# Patient Record
Sex: MALE | Race: ASIAN | NOT HISPANIC OR LATINO
[De-identification: names, ages, dates, MRNs, and addresses within clinical notes are randomized per-mention and may not be internally consistent; named-entity substitution may affect disease eponyms.]

---

## 2018-12-11 ENCOUNTER — APPOINTMENT (OUTPATIENT)
Dept: ORTHOPEDIC SURGERY | Facility: CLINIC | Age: 48
End: 2018-12-11
Payer: COMMERCIAL

## 2018-12-11 VITALS — HEIGHT: 71 IN | BODY MASS INDEX: 24.08 KG/M2 | WEIGHT: 172 LBS

## 2018-12-11 PROCEDURE — 99203 OFFICE O/P NEW LOW 30 MIN: CPT

## 2018-12-11 PROCEDURE — 73552 X-RAY EXAM OF FEMUR 2/>: CPT | Mod: LT

## 2018-12-17 ENCOUNTER — APPOINTMENT (OUTPATIENT)
Dept: ORTHOPEDIC SURGERY | Facility: CLINIC | Age: 48
End: 2018-12-17

## 2019-03-25 ENCOUNTER — APPOINTMENT (OUTPATIENT)
Dept: ORTHOPEDIC SURGERY | Facility: CLINIC | Age: 49
End: 2019-03-25
Payer: COMMERCIAL

## 2019-03-25 VITALS
WEIGHT: 175 LBS | HEART RATE: 75 BPM | HEIGHT: 71 IN | BODY MASS INDEX: 24.5 KG/M2 | DIASTOLIC BLOOD PRESSURE: 89 MMHG | SYSTOLIC BLOOD PRESSURE: 132 MMHG

## 2019-03-25 DIAGNOSIS — M54.16 RADICULOPATHY, LUMBAR REGION: ICD-10-CM

## 2019-03-25 PROCEDURE — 99214 OFFICE O/P EST MOD 30 MIN: CPT

## 2019-03-25 NOTE — DISCUSSION/SUMMARY
[de-identified] : 47 yo male with lumbar radiculopathy with lumbar DDD, L4-S1, cannot rule out spondylolisthesis , patient did not want xray, recommend PT, NSAIDS. Next visit xrays.

## 2019-03-25 NOTE — HISTORY OF PRESENT ILLNESS
[Prolonged Sitting] : worsened by prolonged sitting [Prolonged Standing] : worsened by prolonged standing [Rest] : relieved by rest [de-identified] : Pt presents with c/o LLEk pain on anterior aspect to knee, and occasionally to medial aspect of LLE from knee to ankle. Pt denies numbness,tingling to B/ LE. Pt is not taking meds for pain at this time. Pt uses knee sleeve, this provides some relief in pain. Pt had PT (sessions) last session on 12/2018, this provided some relief in pain. Denies having CONSUELO in  the past.  Pt saw Dr Marte for LLE pain and was diagnosed with benign-appearing lucency involving the proximal femur .  [4] : an average pain level of 4/10 [2] : a minimum pain level of 2/10 [5] : a maximum pain level of 5/10 [Constant] : ~He/She~ states the symptoms seem to be constant [Ataxia] : no ataxia [Incontinence] : no incontinence [Loss of Dexterity] : good dexterity [Urinary Ret.] : no urinary retention

## 2019-03-25 NOTE — PHYSICAL EXAM
[Normal] : Oriented to person, place, and time, insight and judgement were intact and the affect was normal [UE/LE] : Sensory: Intact in bilateral upper & lower extremities [ALL] : Biceps, brachioradialis, triceps, patellar, ankle and plantar 2+ and symmetric bilaterally [Woody's Sign] : negative Woody's sign [Pronator Drift] : negative pronator drift [SLR] : negative straight leg raise [de-identified] : NTTP L spine and b/l paraspinals L region. \par Skin intact L spine. No rashes, ulcers, blisters. \par No lymphedema. \par Rapid alternating movements- Intact. \par Full and non-painful ROM RUE, LUE, RLE, and LLE. Skin intact RUE, LUE, RLE, and LLE. No rashes, blisters, ulcers. NTTP RUE, LUE, RLE, and LLE. No evidence of dislocation or subluxation B/L.\par  [de-identified] : \par \par \par MRI of L Spine - 2018 - Lumbar DDD L4-S1 Deanne

## 2019-08-29 ENCOUNTER — APPOINTMENT (OUTPATIENT)
Dept: ORTHOPEDIC SURGERY | Facility: CLINIC | Age: 49
End: 2019-08-29

## 2020-09-10 ENCOUNTER — APPOINTMENT (OUTPATIENT)
Dept: OTOLARYNGOLOGY | Facility: CLINIC | Age: 50
End: 2020-09-10
Payer: COMMERCIAL

## 2020-09-10 VITALS
DIASTOLIC BLOOD PRESSURE: 88 MMHG | HEART RATE: 80 BPM | WEIGHT: 170 LBS | SYSTOLIC BLOOD PRESSURE: 123 MMHG | BODY MASS INDEX: 23.28 KG/M2 | HEIGHT: 71.5 IN

## 2020-09-10 PROCEDURE — 99204 OFFICE O/P NEW MOD 45 MIN: CPT

## 2020-09-10 NOTE — CONSULT LETTER
[Dear  ___] : Dear  [unfilled], [Please see my note below.] : Please see my note below. [Consult Letter:] : I had the pleasure of evaluating your patient, [unfilled]. [Consult Closing:] : Thank you very much for allowing me to participate in the care of this patient.  If you have any questions, please do not hesitate to contact me. [Sincerely,] : Sincerely, [FreeTextEntry2] : Dr Joseph Bartholomew [FreeTextEntry3] : \par \par \par \par

## 2020-09-10 NOTE — HISTORY OF PRESENT ILLNESS
[de-identified] : 49 yro male referred by oral surgeon/ PCP for jaw mass. Pt reports jaw pain in June, spoke to dentist June 1, 2010 and was prescribed Amoxicillin, which helped. Pt saw dentist late June for root canal, and in July had wisdom tooth removed, both procedures were in upper right side. End of July, pt felt mass on bottom right jaw, f/u with dentist who refer him to oral surgeon. Pt was told by PCP to see ENT. Saw Dr. Russell at [facility] for needle biopsy, results were non-diagnostic and was advised to f/u for another opinion. \par Denies pain, but reports that mass is getting bigger and harder over the last 1.5 months. Pt able to eat and drink well, reports 5 lbs weight loss over the last few months. Denies fever, chills, dyspnea, dysphagia, dysphonia. Pt have hx of seasonal allergies, with post-nasal drip and voice hoarseness in the mornings, last episode was 2 weeks ago.\par \par pathology 8/21/20: : right buccal lesion: non-diagnostic material present

## 2020-09-15 ENCOUNTER — APPOINTMENT (OUTPATIENT)
Dept: MRI IMAGING | Facility: CLINIC | Age: 50
End: 2020-09-15

## 2020-09-15 ENCOUNTER — TRANSCRIPTION ENCOUNTER (OUTPATIENT)
Age: 50
End: 2020-09-15

## 2020-09-15 ENCOUNTER — OUTPATIENT (OUTPATIENT)
Dept: OUTPATIENT SERVICES | Facility: HOSPITAL | Age: 50
LOS: 1 days | End: 2020-09-15

## 2020-09-15 DIAGNOSIS — L98.9 DISORDER OF THE SKIN AND SUBCUTANEOUS TISSUE, UNSPECIFIED: ICD-10-CM

## 2020-09-15 DIAGNOSIS — D17.9 BENIGN LIPOMATOUS NEOPLASM, UNSPECIFIED: Chronic | ICD-10-CM

## 2020-09-20 ENCOUNTER — APPOINTMENT (OUTPATIENT)
Dept: MRI IMAGING | Facility: CLINIC | Age: 50
End: 2020-09-20
Payer: COMMERCIAL

## 2020-09-20 ENCOUNTER — OUTPATIENT (OUTPATIENT)
Dept: OUTPATIENT SERVICES | Facility: HOSPITAL | Age: 50
LOS: 1 days | End: 2020-09-20
Payer: COMMERCIAL

## 2020-09-20 DIAGNOSIS — D17.9 BENIGN LIPOMATOUS NEOPLASM, UNSPECIFIED: Chronic | ICD-10-CM

## 2020-09-20 DIAGNOSIS — L98.9 DISORDER OF THE SKIN AND SUBCUTANEOUS TISSUE, UNSPECIFIED: ICD-10-CM

## 2020-09-20 PROCEDURE — 70543 MRI ORBT/FAC/NCK W/O &W/DYE: CPT | Mod: 26

## 2020-09-20 PROCEDURE — 70543 MRI ORBT/FAC/NCK W/O &W/DYE: CPT

## 2020-09-20 PROCEDURE — A9585: CPT

## 2020-09-24 ENCOUNTER — APPOINTMENT (OUTPATIENT)
Dept: OTOLARYNGOLOGY | Facility: CLINIC | Age: 50
End: 2020-09-24
Payer: COMMERCIAL

## 2020-09-24 PROCEDURE — 99214 OFFICE O/P EST MOD 30 MIN: CPT

## 2020-09-24 NOTE — CONSULT LETTER
[Dear  ___] : Dear  [unfilled], [Courtesy Letter:] : I had the pleasure of seeing your patient, [unfilled], in my office today. [Please see my note below.] : Please see my note below. [Consult Closing:] : Thank you very much for allowing me to participate in the care of this patient.  If you have any questions, please do not hesitate to contact me. [Sincerely,] : Sincerely, [FreeTextEntry2] : Dr Joseph Bartholomew, Dr Han Olivares [FreeTextEntry3] : \par Sharan Aguilera MD, FACS\par \par Otolaryngology-Head and Neck Surgery\par [employer] [facility]\par

## 2020-09-24 NOTE — HISTORY OF PRESENT ILLNESS
[de-identified] : 49 yro male here today to discuss MRI results. Pt referred by oral surgeon/ PCP for jaw mass. Pt reports jaw pain in June, spoke to dentist June 1, 2010 and was prescribed Amoxicillin, which helped. Pt saw dentist late June for root canal, and in July had wisdom tooth removed, both procedures were in upper right side. End of July, pt felt mass on bottom right jaw, f/u with dentist who refer him to oral surgeon. Pt was told by PCP to see ENT. [place] [facility] [place], results were non-diagnostic and was advised to f/u for another opinion. \par Today denies pain, but reports that mass is getting bigger and harder over the last 1.5 months. Pt able to eat and drink well, reports 5 lbs weight loss over the last few months. Denies fever, chills, dyspnea, dysphagia, dysphonia. Pt have hx of seasonal allergies.Today pts reports an itchy feeling on the chin for the past few weeks, not near the mass, relieved by heat like a sore muscle. \par \par pathology 8/21/20 : right buccal lesion: non-diagnostic material present    \par MRI 9/20/20:  Mildly enlarged right level II lymph node. No mass or adenopathy otherwise identified.\par Complete review of systems which was performed during a previous encounter was reviewed with the patient and there are no changes except as stated in the HPI section.\par \par

## 2020-10-01 ENCOUNTER — TRANSCRIPTION ENCOUNTER (OUTPATIENT)
Age: 50
End: 2020-10-01

## 2020-10-08 ENCOUNTER — APPOINTMENT (OUTPATIENT)
Dept: OTOLARYNGOLOGY | Facility: CLINIC | Age: 50
End: 2020-10-08
Payer: COMMERCIAL

## 2020-10-08 VITALS
WEIGHT: 170 LBS | DIASTOLIC BLOOD PRESSURE: 87 MMHG | HEART RATE: 71 BPM | BODY MASS INDEX: 23.28 KG/M2 | SYSTOLIC BLOOD PRESSURE: 131 MMHG | HEIGHT: 71.5 IN

## 2020-10-08 VITALS — HEART RATE: 71 BPM

## 2020-10-08 PROCEDURE — 99214 OFFICE O/P EST MOD 30 MIN: CPT

## 2020-10-08 RX ORDER — ALPRAZOLAM 0.25 MG/1
0.25 TABLET ORAL
Qty: 2 | Refills: 0 | Status: COMPLETED | COMMUNITY
Start: 2020-09-17 | End: 2020-10-08

## 2020-10-08 NOTE — HISTORY OF PRESENT ILLNESS
[de-identified] : 49 yro male here today to further discuss MRI results. Pt referred by oral surgeon/ PCP for jaw mass. Pt reports jaw pain in June, spoke to dentist June 1, 2010 and was prescribed Amoxicillin, which helped. Pt saw dentist late June for root canal, and in July had wisdom tooth removed, both procedures were in upper right side. End of July, pt felt mass on bottom right jaw, f/u with dentist who refer him to oral surgeon. Pt was told by PCP to see ENT. Saw Dr. Russell at [facility] for needle biopsy, results were non-diagnostic and was advised to f/u for another opinion.\par  \par Today denies pain, but reports soreness on the right side of chin, and c/o some irritation near lower right jaw back tooth. \par Denies fever, chills, dysphagia, dyspnea, dysphonia. Able to tolerate regular diet. \par \par pathology 8/21/20 : right buccal lesion: non-diagnostic material present    \par MRI 9/20/20:  Mildly enlarged right level II lymph node. No mass or adenopathy otherwise identified.\par Complete review of systems which was performed during a previous encounter was reviewed with the patient and there are no changes except as stated in the HPI section.\par \par

## 2020-12-03 ENCOUNTER — APPOINTMENT (OUTPATIENT)
Dept: OTOLARYNGOLOGY | Facility: CLINIC | Age: 50
End: 2020-12-03
Payer: COMMERCIAL

## 2020-12-03 ENCOUNTER — LABORATORY RESULT (OUTPATIENT)
Age: 50
End: 2020-12-03

## 2020-12-03 VITALS
HEART RATE: 76 BPM | BODY MASS INDEX: 23.96 KG/M2 | SYSTOLIC BLOOD PRESSURE: 132 MMHG | DIASTOLIC BLOOD PRESSURE: 90 MMHG | HEIGHT: 71.5 IN | WEIGHT: 175 LBS

## 2020-12-03 PROCEDURE — 99214 OFFICE O/P EST MOD 30 MIN: CPT

## 2020-12-03 PROCEDURE — 99072 ADDL SUPL MATRL&STAF TM PHE: CPT

## 2020-12-03 NOTE — HISTORY OF PRESENT ILLNESS
[de-identified] : 49 yro male here today for 2 month f/up. Pt was referred by oral surgeon/ PCP for right side jaw mass. Pt had seen Dr. Russell at [facility] for needle biopsy, results were non-diagnostic and was advised to f/u for another opinion.\par Today pt denies pain or bleeding, but feels that lesion has gotten bigger and it not going away, and feels that right side of face seems swollen since last visit. Denies fever, chills, dysphagia, dyspnea, dysphonia. Able to tolerate regular diet. \par \par pathology 8/21/20 : right buccal lesion: non-diagnostic material present  \par   \par MRI 9/20/20:  Mildly enlarged right level II lymph node. No mass or adenopathy otherwise identified.\par Complete review of systems which was performed during a previous encounter was reviewed with the patient and there are no changes except as stated in the HPI section.\par \par \par

## 2020-12-03 NOTE — CONSULT LETTER
[Dear  ___] : Dear  [unfilled], [Courtesy Letter:] : I had the pleasure of seeing your patient, [unfilled], in my office today. [Please see my note below.] : Please see my note below. [Consult Closing:] : Thank you very much for allowing me to participate in the care of this patient.  If you have any questions, please do not hesitate to contact me. [Sincerely,] : Sincerely, [FreeTextEntry2] : Dr Joseph Bartholomew, Dr Han Olivares  [FreeTextEntry3] : \par Sharan Aguilera MD, FACS\par \par Otolaryngology-Head and Neck Surgery\par [employer] [facility]\par

## 2020-12-03 NOTE — PHYSICAL EXAM
[Midline] : trachea located in midline position [Normal] : no rashes [de-identified] : Presence of a subcentimeter submucosal lesion of the inferior vestibule of the mouth near the pre molars.

## 2020-12-08 ENCOUNTER — NON-APPOINTMENT (OUTPATIENT)
Age: 50
End: 2020-12-08

## 2020-12-16 ENCOUNTER — APPOINTMENT (OUTPATIENT)
Dept: CT IMAGING | Facility: IMAGING CENTER | Age: 50
End: 2020-12-16
Payer: COMMERCIAL

## 2020-12-16 ENCOUNTER — OUTPATIENT (OUTPATIENT)
Dept: OUTPATIENT SERVICES | Facility: HOSPITAL | Age: 50
LOS: 1 days | End: 2020-12-16
Payer: COMMERCIAL

## 2020-12-16 DIAGNOSIS — Z00.8 ENCOUNTER FOR OTHER GENERAL EXAMINATION: ICD-10-CM

## 2020-12-16 DIAGNOSIS — D17.9 BENIGN LIPOMATOUS NEOPLASM, UNSPECIFIED: Chronic | ICD-10-CM

## 2020-12-16 DIAGNOSIS — L98.9 DISORDER OF THE SKIN AND SUBCUTANEOUS TISSUE, UNSPECIFIED: ICD-10-CM

## 2020-12-16 PROCEDURE — 70491 CT SOFT TISSUE NECK W/DYE: CPT | Mod: 26

## 2020-12-16 PROCEDURE — 70491 CT SOFT TISSUE NECK W/DYE: CPT

## 2020-12-21 ENCOUNTER — NON-APPOINTMENT (OUTPATIENT)
Age: 50
End: 2020-12-21

## 2020-12-29 ENCOUNTER — OUTPATIENT (OUTPATIENT)
Dept: OUTPATIENT SERVICES | Facility: HOSPITAL | Age: 50
LOS: 1 days | End: 2020-12-29

## 2020-12-29 VITALS
DIASTOLIC BLOOD PRESSURE: 82 MMHG | HEART RATE: 85 BPM | RESPIRATION RATE: 16 BRPM | WEIGHT: 181 LBS | SYSTOLIC BLOOD PRESSURE: 130 MMHG | HEIGHT: 70 IN | TEMPERATURE: 98 F | OXYGEN SATURATION: 99 %

## 2020-12-29 DIAGNOSIS — L98.8 OTHER SPECIFIED DISORDERS OF THE SKIN AND SUBCUTANEOUS TISSUE: ICD-10-CM

## 2020-12-29 DIAGNOSIS — D17.9 BENIGN LIPOMATOUS NEOPLASM, UNSPECIFIED: Chronic | ICD-10-CM

## 2020-12-29 LAB
ANION GAP SERPL CALC-SCNC: 12 MMOL/L — SIGNIFICANT CHANGE UP (ref 7–14)
BUN SERPL-MCNC: 13 MG/DL — SIGNIFICANT CHANGE UP (ref 7–23)
CALCIUM SERPL-MCNC: 9.8 MG/DL — SIGNIFICANT CHANGE UP (ref 8.4–10.5)
CHLORIDE SERPL-SCNC: 101 MMOL/L — SIGNIFICANT CHANGE UP (ref 98–107)
CO2 SERPL-SCNC: 25 MMOL/L — SIGNIFICANT CHANGE UP (ref 22–31)
CREAT SERPL-MCNC: 0.9 MG/DL — SIGNIFICANT CHANGE UP (ref 0.5–1.3)
GLUCOSE SERPL-MCNC: 78 MG/DL — SIGNIFICANT CHANGE UP (ref 70–99)
HCT VFR BLD CALC: 46.9 % — SIGNIFICANT CHANGE UP (ref 39–50)
HGB BLD-MCNC: 15.2 G/DL — SIGNIFICANT CHANGE UP (ref 13–17)
MCHC RBC-ENTMCNC: 28.8 PG — SIGNIFICANT CHANGE UP (ref 27–34)
MCHC RBC-ENTMCNC: 32.4 GM/DL — SIGNIFICANT CHANGE UP (ref 32–36)
MCV RBC AUTO: 88.8 FL — SIGNIFICANT CHANGE UP (ref 80–100)
NRBC # BLD: 0 /100 WBCS — SIGNIFICANT CHANGE UP
NRBC # FLD: 0 K/UL — SIGNIFICANT CHANGE UP
PLATELET # BLD AUTO: 258 K/UL — SIGNIFICANT CHANGE UP (ref 150–400)
POTASSIUM SERPL-MCNC: 4 MMOL/L — SIGNIFICANT CHANGE UP (ref 3.5–5.3)
POTASSIUM SERPL-SCNC: 4 MMOL/L — SIGNIFICANT CHANGE UP (ref 3.5–5.3)
RBC # BLD: 5.28 M/UL — SIGNIFICANT CHANGE UP (ref 4.2–5.8)
RBC # FLD: 13.2 % — SIGNIFICANT CHANGE UP (ref 10.3–14.5)
SODIUM SERPL-SCNC: 138 MMOL/L — SIGNIFICANT CHANGE UP (ref 135–145)
WBC # BLD: 7.82 K/UL — SIGNIFICANT CHANGE UP (ref 3.8–10.5)
WBC # FLD AUTO: 7.82 K/UL — SIGNIFICANT CHANGE UP (ref 3.8–10.5)

## 2020-12-29 RX ORDER — SODIUM CHLORIDE 9 MG/ML
1000 INJECTION, SOLUTION INTRAVENOUS
Refills: 0 | Status: DISCONTINUED | OUTPATIENT
Start: 2021-01-25 | End: 2021-02-09

## 2020-12-29 RX ORDER — SODIUM CHLORIDE 9 MG/ML
3 INJECTION INTRAMUSCULAR; INTRAVENOUS; SUBCUTANEOUS EVERY 8 HOURS
Refills: 0 | Status: DISCONTINUED | OUTPATIENT
Start: 2021-01-25 | End: 2021-02-09

## 2020-12-29 NOTE — H&P PST ADULT - NEGATIVE GASTROINTESTINAL SYMPTOMS
no nausea/no vomiting/no diarrhea/no constipation/no abdominal pain/no melena/no steatorrhea/no jaundice/no hiccoughs

## 2020-12-29 NOTE — H&P PST ADULT - NEGATIVE MUSCULOSKELETAL SYMPTOMS
no arthralgia/no arthritis/no joint swelling/no myalgia/no muscle cramps/no muscle weakness/no stiffness/no neck pain/no back pain/no leg pain L/no leg pain R

## 2020-12-29 NOTE — H&P PST ADULT - NEGATIVE OPHTHALMOLOGIC SYMPTOMS
no diplopia/no photophobia/no lacrimation L/no lacrimation R/no blurred vision L/no blurred vision R/no discharge L/no discharge R/no pain L/no pain R/no irritation L/no irritation R/no loss of vision L/no loss of vision R/no scleral injection R

## 2020-12-29 NOTE — H&P PST ADULT - RS GEN PE MLT RESP DETAILS PC
airway patent/breath sounds equal/good air movement/respirations non-labored/clear to auscultation bilaterally/no chest wall tenderness/no intercostal retractions/no rales/no rhonchi/no subcutaneous emphysema/no wheezes/airway obstructed

## 2020-12-29 NOTE — H&P PST ADULT - NEGATIVE GENERAL SYMPTOMS
no fever/no chills/no sweating/no weight loss/no weight gain/no polyphagia/no polyuria no fever/no chills/no sweating/no weight loss/no weight gain/no polyphagia/no polyuria/no malaise

## 2020-12-29 NOTE — H&P PST ADULT - NSICDXPASTMEDICALHX_GEN_ALL_CORE_FT
PAST MEDICAL HISTORY:  GERD (Gastroesophageal Reflux Disease)     H Pylori Ulcer dx 2007 with endoscopy    High Blood Triglycerides tx with medication short term pt reports improvement    HTN (hypertension) no meds    Kidney Stones last 4-5 years ago    Lipoma left shoulder 2011

## 2020-12-29 NOTE — H&P PST ADULT - NEGATIVE GENERAL GENITOURINARY SYMPTOMS
no hematuria/no renal colic/no flank pain L/no flank pain R/no urine discoloration/no gas in urine/no bladder infections/no incontinence/no dysuria/no urinary hesitancy/normal urinary frequency/no nocturia

## 2020-12-29 NOTE — H&P PST ADULT - ASSESSMENT
49 y/o male presents with pre op diagnosis: other specified disorders of the skin and subcutaneous tissue

## 2020-12-29 NOTE — H&P PST ADULT - NSANTHOSAYNRD_GEN_A_CORE
No. NICOLLE screening performed.  STOP BANG Legend: 0-2 = LOW Risk; 3-4 = INTERMEDIATE Risk; 5-8 = HIGH Risk

## 2020-12-29 NOTE — H&P PST ADULT - NSICDXPROBLEM_GEN_ALL_CORE_FT
PROBLEM DIAGNOSES  Problem: Other specified disorders of the skin and subcutaneous tissue  Assessment and Plan: Scheduled for excision of lesion of mucosa and submucosa on 01/06/2021. Pre op instructions, famotidine given and explained. Pt verbalized understanding.   Pt confirmed scheduled appt for Covid-19 test pre op

## 2020-12-29 NOTE — H&P PST ADULT - NEGATIVE ENMT SYMPTOMS
no hearing difficulty/no ear pain/no tinnitus/no vertigo/no sinus symptoms/no nose bleeds/no abnormal taste sensation/no gum bleeding/no dry mouth/no throat pain/no dysphagia

## 2020-12-29 NOTE — H&P PST ADULT - NSICDXPASTSURGICALHX_GEN_ALL_CORE_FT
PAST SURGICAL HISTORY:  Atypical lipoma of soft tissue Excison of lipoma x 2 (2011 & 2012)    Fatty tumor Excision of left upper arm mass ; 12/2015

## 2020-12-29 NOTE — H&P PST ADULT - NEGATIVE NEUROLOGICAL SYMPTOMS
no transient paralysis/no weakness/no paresthesias/no syncope/no tremors/no vertigo/no loss of sensation/no difficulty walking/no headache/no loss of consciousness/no hemiparesis

## 2020-12-29 NOTE — H&P PST ADULT - HISTORY OF PRESENT ILLNESS
50 yr old male with h/o Dyslipidemia presents to PST for pre op evaluation stated that he " noticed small mass in mouth, saw dentist then PCP recommended ENT eval.  S/P FNA. S/P MRI (09/20/2020). S/P Ct scan (12/16/2020) and biopsy (12/03/2020). Pre op      50 yr old male with h/o Dyslipidemia presents to [facility] for pre op evaluation stated that he " noticed small mass in mouth, saw dentist then PCP who recommended ENT evaluation. S/P FNA. S/P MRI (09/20/2020). S/P Ct scan (12/16/2020) and biopsy (12/03/2020). Pre op other specified disorders of the skin and subcutaneous tissue. Now scheduled for excision of lesion of mucosa and submucosa on 01/06/2021

## 2021-01-01 DIAGNOSIS — Z01.818 ENCOUNTER FOR OTHER PREPROCEDURAL EXAMINATION: ICD-10-CM

## 2021-01-03 ENCOUNTER — APPOINTMENT (OUTPATIENT)
Dept: DISASTER EMERGENCY | Facility: CLINIC | Age: 51
End: 2021-01-03

## 2021-01-14 ENCOUNTER — OUTPATIENT (OUTPATIENT)
Dept: OUTPATIENT SERVICES | Facility: HOSPITAL | Age: 51
LOS: 1 days | End: 2021-01-14
Payer: COMMERCIAL

## 2021-01-14 ENCOUNTER — APPOINTMENT (OUTPATIENT)
Dept: OTOLARYNGOLOGY | Facility: CLINIC | Age: 51
End: 2021-01-14
Payer: COMMERCIAL

## 2021-01-14 VITALS
WEIGHT: 180 LBS | DIASTOLIC BLOOD PRESSURE: 91 MMHG | SYSTOLIC BLOOD PRESSURE: 129 MMHG | HEIGHT: 71.5 IN | BODY MASS INDEX: 24.65 KG/M2 | HEART RATE: 75 BPM

## 2021-01-14 DIAGNOSIS — L98.8 OTHER SPECIFIED DISORDERS OF THE SKIN AND SUBCUTANEOUS TISSUE: ICD-10-CM

## 2021-01-14 DIAGNOSIS — D17.9 BENIGN LIPOMATOUS NEOPLASM, UNSPECIFIED: Chronic | ICD-10-CM

## 2021-01-14 PROCEDURE — 99072 ADDL SUPL MATRL&STAF TM PHE: CPT

## 2021-01-14 PROCEDURE — 99214 OFFICE O/P EST MOD 30 MIN: CPT

## 2021-01-14 PROCEDURE — 93010 ELECTROCARDIOGRAM REPORT: CPT

## 2021-01-14 NOTE — CONSULT LETTER
[Dear  ___] : Dear  [unfilled], [Courtesy Letter:] : I had the pleasure of seeing your patient, [unfilled], in my office today. [Please see my note below.] : Please see my note below. [Consult Closing:] : Thank you very much for allowing me to participate in the care of this patient.  If you have any questions, please do not hesitate to contact me. [Sincerely,] : Sincerely, [FreeTextEntry2] : Dr Joseph Bartholomew, Dr Han Olivares  [FreeTextEntry3] : \par Sharan Aguilera MD, FACS\par \par Otolaryngology-Head and Neck Surgery\par [employer] [facility]\par

## 2021-01-14 NOTE — HISTORY OF PRESENT ILLNESS
[de-identified] : 50 yro male here today to discuss surgery in more detail. Pt was referred by oral surgeon/ PCP for right side jaw mass. [place] [facility], results were non-diagnostic and was advised to f/u for another opinion.\par Today pt denies pain or bleeding, feels that lesion is same size since last visit. Pt still feels that right side of face seems swollen. Denies fever, chills, dysphagia, dyspnea, dysphonia. Able to tolerate eating and drinking well.  \par \par CT 12/21/2020: IMPRESSION:\par No mass identified within the buccal cavity.\par Mildly enlarged right level II and prominent sized left level II lymph nodes which are stable.\par No other cervical adenopathy.\par \par pathology 12/7/2020: Final Diagnosis\par           VESTIBULE OF THE MOUTH, INFERIOR, RIGHT, FNA\par           NEGATIVE FOR MALIGNANT CELLS.\par Complete review of systems which was performed during a previous encounter was reviewed with the patient and there are no changes except as stated in the HPI section.\par \par \par \par \par

## 2021-01-22 ENCOUNTER — APPOINTMENT (OUTPATIENT)
Dept: DISASTER EMERGENCY | Facility: CLINIC | Age: 51
End: 2021-01-22

## 2021-01-22 NOTE — ASU PATIENT PROFILE, ADULT - PRO INTERPRETER NEED 2
- f/u cultures  - continue empiric abx to complete 7 day course  - no endoscopic procedures at this time
English

## 2021-01-22 NOTE — ASU PATIENT PROFILE, ADULT - PSH
Atypical lipoma of soft tissue  Excison of lipoma x 2 (2011 & 2012)  Fatty tumor  Excision of left upper arm mass ; 12/2015

## 2021-01-23 LAB — SARS-COV-2 N GENE NPH QL NAA+PROBE: NOT DETECTED

## 2021-01-24 ENCOUNTER — TRANSCRIPTION ENCOUNTER (OUTPATIENT)
Age: 51
End: 2021-01-24

## 2021-01-25 ENCOUNTER — OUTPATIENT (OUTPATIENT)
Dept: OUTPATIENT SERVICES | Facility: HOSPITAL | Age: 51
LOS: 1 days | Discharge: HOME | End: 2021-01-25
Payer: COMMERCIAL

## 2021-01-25 ENCOUNTER — APPOINTMENT (OUTPATIENT)
Dept: OTOLARYNGOLOGY | Facility: HOSPITAL | Age: 51
End: 2021-01-25

## 2021-01-25 ENCOUNTER — RESULT REVIEW (OUTPATIENT)
Age: 51
End: 2021-01-25

## 2021-01-25 VITALS
RESPIRATION RATE: 16 BRPM | DIASTOLIC BLOOD PRESSURE: 99 MMHG | HEIGHT: 70 IN | OXYGEN SATURATION: 98 % | SYSTOLIC BLOOD PRESSURE: 137 MMHG | HEART RATE: 75 BPM | WEIGHT: 181 LBS | TEMPERATURE: 98 F

## 2021-01-25 VITALS
OXYGEN SATURATION: 100 % | DIASTOLIC BLOOD PRESSURE: 80 MMHG | RESPIRATION RATE: 15 BRPM | SYSTOLIC BLOOD PRESSURE: 123 MMHG | HEART RATE: 81 BPM

## 2021-01-25 DIAGNOSIS — L98.8 OTHER SPECIFIED DISORDERS OF THE SKIN AND SUBCUTANEOUS TISSUE: ICD-10-CM

## 2021-01-25 DIAGNOSIS — D17.9 BENIGN LIPOMATOUS NEOPLASM, UNSPECIFIED: Chronic | ICD-10-CM

## 2021-01-25 PROCEDURE — 88307 TISSUE EXAM BY PATHOLOGIST: CPT | Mod: 26

## 2021-01-25 PROCEDURE — 40812 EXCISE/REPAIR MOUTH LESION: CPT

## 2021-01-25 RX ORDER — HYDROMORPHONE HYDROCHLORIDE 2 MG/ML
0.5 INJECTION INTRAMUSCULAR; INTRAVENOUS; SUBCUTANEOUS
Refills: 0 | Status: DISCONTINUED | OUTPATIENT
Start: 2021-01-25 | End: 2021-01-25

## 2021-01-25 RX ORDER — UBIDECARENONE 100 MG
1 CAPSULE ORAL
Qty: 0 | Refills: 0 | DISCHARGE

## 2021-01-25 RX ORDER — PROPOFOL 10 MG/ML
20 INJECTION, EMULSION INTRAVENOUS ONCE
Refills: 0 | Status: COMPLETED | OUTPATIENT
Start: 2021-01-25 | End: 2021-01-25

## 2021-01-25 RX ORDER — ASPIRIN/CALCIUM CARB/MAGNESIUM 324 MG
0 TABLET ORAL
Qty: 0 | Refills: 0 | DISCHARGE

## 2021-01-25 RX ORDER — FENTANYL CITRATE 50 UG/ML
25 INJECTION INTRAVENOUS
Refills: 0 | Status: DISCONTINUED | OUTPATIENT
Start: 2021-01-25 | End: 2021-01-25

## 2021-01-25 RX ORDER — ATORVASTATIN CALCIUM 80 MG/1
1 TABLET, FILM COATED ORAL
Qty: 0 | Refills: 0 | DISCHARGE

## 2021-01-25 RX ORDER — OMEGA-3 ACID ETHYL ESTERS 1 G
0 CAPSULE ORAL
Qty: 0 | Refills: 0 | DISCHARGE

## 2021-01-25 RX ORDER — OXYCODONE HYDROCHLORIDE 5 MG/1
5 TABLET ORAL ONCE
Refills: 0 | Status: DISCONTINUED | OUTPATIENT
Start: 2021-01-25 | End: 2021-01-25

## 2021-01-25 RX ORDER — ONDANSETRON 8 MG/1
4 TABLET, FILM COATED ORAL ONCE
Refills: 0 | Status: COMPLETED | OUTPATIENT
Start: 2021-01-25 | End: 2021-01-25

## 2021-01-25 RX ORDER — OXYCODONE HYDROCHLORIDE 5 MG/1
1 TABLET ORAL
Qty: 5 | Refills: 0
Start: 2021-01-25 | End: 2021-01-26

## 2021-01-25 RX ORDER — CHLORHEXIDINE GLUCONATE 213 G/1000ML
15 SOLUTION TOPICAL
Qty: 210 | Refills: 0
Start: 2021-01-25 | End: 2021-01-31

## 2021-01-25 RX ADMIN — ONDANSETRON 4 MILLIGRAM(S): 8 TABLET, FILM COATED ORAL at 18:30

## 2021-01-25 RX ADMIN — OXYCODONE HYDROCHLORIDE 5 MILLIGRAM(S): 5 TABLET ORAL at 19:21

## 2021-01-25 RX ADMIN — PROPOFOL 20 MILLIGRAM(S): 10 INJECTION, EMULSION INTRAVENOUS at 19:50

## 2021-01-25 NOTE — ASU DISCHARGE PLAN (ADULT/PEDIATRIC) - CARE PROVIDER_API CALL
Sharan Aguilera)  Otolaryngology  [address] [place] [address]  Phone: [phone]  Fax: [phone]  Follow Up Time:

## 2021-01-25 NOTE — ASU DISCHARGE PLAN (ADULT/PEDIATRIC) - CALL YOUR DOCTOR IF YOU HAVE ANY OF THE FOLLOWING:
Swelling that gets worse/Wound/Surgical Site with redness, or foul smelling discharge or pus Bleeding that does not stop/Swelling that gets worse/Wound/Surgical Site with redness, or foul smelling discharge or pus/Nausea and vomiting that does not stop/Unable to urinate/Inability to tolerate liquids or foods

## 2021-01-25 NOTE — ASU DISCHARGE PLAN (ADULT/PEDIATRIC) - ASU DC SPECIAL INSTRUCTIONSFT
Please follow up with Dr. Aguilera within 1 week after discharge from the hospital. You may call [phone] to schedule an appointment.     Please take over the counter Tylenol as directed and supplement with the narcotic sent to your pharmacy for severe pain.     Please use the Peridex mouth wash as prescribed.     Please follow a liquid diet tonight (1/25/21) and you may advance to a full diet tomorrow.

## 2021-01-27 LAB — SURGICAL PATHOLOGY STUDY: SIGNIFICANT CHANGE UP

## 2021-07-16 NOTE — ASU PREOP CHECKLIST - DNR CLARIFICATION FORM COMPLETED
Patient: Oneida Hagan    Procedure Summary     Date: 07/16/21 Room / Location: [address] [place] [address] MAIN OR 06 / [address] [place] [address] MAIN OR    Anesthesia Start: 0679 Anesthesia Stop: 7190    Procedure: Right posterior total hip arthroplasty  Anesthesia type: IV/Sedation - MAC and Block - n/a

## 2021-11-24 NOTE — ASU PATIENT PROFILE, ADULT - PAIN CHRONIC, PROFILE
Albendazole Counseling:  I discussed with the patient the risks of albendazole including but not limited to cytopenia, kidney damage, nausea/vomiting and severe allergy.  The patient understands that this medication is being used in an off-label manner. no

## 2021-12-07 ENCOUNTER — APPOINTMENT (OUTPATIENT)
Dept: OTOLARYNGOLOGY | Facility: CLINIC | Age: 51
End: 2021-12-07
Payer: COMMERCIAL

## 2021-12-07 VITALS
HEART RATE: 92 BPM | HEIGHT: 71.5 IN | DIASTOLIC BLOOD PRESSURE: 91 MMHG | WEIGHT: 185 LBS | BODY MASS INDEX: 25.33 KG/M2 | SYSTOLIC BLOOD PRESSURE: 141 MMHG

## 2021-12-07 VITALS — TEMPERATURE: 91.4 F

## 2021-12-07 DIAGNOSIS — M89.9 DISORDER OF BONE, UNSPECIFIED: ICD-10-CM

## 2021-12-07 PROCEDURE — 99214 OFFICE O/P EST MOD 30 MIN: CPT

## 2021-12-07 RX ORDER — ATORVASTATIN CALCIUM 10 MG/1
10 TABLET, FILM COATED ORAL
Qty: 90 | Refills: 0 | Status: ACTIVE | COMMUNITY
Start: 2021-11-06

## 2021-12-07 RX ORDER — ASPIRIN 81 MG/1
81 TABLET, COATED ORAL
Qty: 90 | Refills: 0 | Status: ACTIVE | COMMUNITY
Start: 2021-11-06

## 2021-12-07 NOTE — CONSULT LETTER
[Dear  ___] : Dear  [unfilled], [Courtesy Letter:] : I had the pleasure of seeing your patient, [unfilled], in my office today. [Please see my note below.] : Please see my note below. [Consult Closing:] : Thank you very much for allowing me to participate in the care of this patient.  If you have any questions, please do not hesitate to contact me. [Sincerely,] : Sincerely, [FreeTextEntry2] : Dr Joseph Bartholomew, Dr Han Olivares  [FreeTextEntry3] : \par Sharan Aguilera MD, FACS\par \par Otolaryngology-Head and Neck Surgery\par [employer] [facility]\par

## 2021-12-07 NOTE — REASON FOR VISIT
[Subsequent Evaluation] : a subsequent evaluation for [FreeTextEntry2] : buccal lesion removed - lipoma

## 2021-12-07 NOTE — HISTORY OF PRESENT ILLNESS
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 pt has no c.o of the oral area. pt c/o right side forehead lesion for few months, hard and pointy, may increase size, no pain, no bleeding, no trauma or headache. \par Complete review of systems which was performed during a previous encounter was reviewed with the patient and there are no changes except as stated in the HPI section.\par \par

## 2022-02-12 ENCOUNTER — OUTPATIENT (OUTPATIENT)
Dept: OUTPATIENT SERVICES | Facility: HOSPITAL | Age: 52
LOS: 1 days | End: 2022-02-12
Payer: COMMERCIAL

## 2022-02-12 ENCOUNTER — APPOINTMENT (OUTPATIENT)
Dept: CT IMAGING | Facility: IMAGING CENTER | Age: 52
End: 2022-02-12
Payer: COMMERCIAL

## 2022-02-12 DIAGNOSIS — D17.9 BENIGN LIPOMATOUS NEOPLASM, UNSPECIFIED: Chronic | ICD-10-CM

## 2022-02-12 DIAGNOSIS — M89.9 DISORDER OF BONE, UNSPECIFIED: ICD-10-CM

## 2022-02-12 PROCEDURE — 70491 CT SOFT TISSUE NECK W/DYE: CPT

## 2022-02-12 PROCEDURE — 82565 ASSAY OF CREATININE: CPT

## 2022-02-12 PROCEDURE — 70491 CT SOFT TISSUE NECK W/DYE: CPT | Mod: 26

## 2022-02-14 ENCOUNTER — EMERGENCY (EMERGENCY)
Facility: HOSPITAL | Age: 52
LOS: 1 days | Discharge: HOME | End: 2022-02-14
Attending: EMERGENCY MEDICINE
Payer: COMMERCIAL

## 2022-02-14 VITALS
RESPIRATION RATE: 18 BRPM | TEMPERATURE: 99 F | HEIGHT: 70 IN | DIASTOLIC BLOOD PRESSURE: 83 MMHG | OXYGEN SATURATION: 99 % | SYSTOLIC BLOOD PRESSURE: 155 MMHG | HEART RATE: 84 BPM | WEIGHT: 184.97 LBS

## 2022-02-14 DIAGNOSIS — D17.9 BENIGN LIPOMATOUS NEOPLASM, UNSPECIFIED: Chronic | ICD-10-CM

## 2022-02-14 PROCEDURE — 99283 EMERGENCY DEPT VISIT LOW MDM: CPT

## 2022-02-14 PROCEDURE — 99284 EMERGENCY DEPT VISIT MOD MDM: CPT

## 2022-02-14 RX ORDER — CYCLOBENZAPRINE HYDROCHLORIDE 10 MG/1
1 TABLET, FILM COATED ORAL
Qty: 6 | Refills: 0
Start: 2022-02-14 | End: 2022-02-15

## 2022-02-14 RX ORDER — CYCLOBENZAPRINE HYDROCHLORIDE 10 MG/1
5 TABLET, FILM COATED ORAL ONCE
Refills: 0 | Status: COMPLETED | OUTPATIENT
Start: 2022-02-14 | End: 2022-02-14

## 2022-02-14 RX ORDER — LIDOCAINE 4 G/100G
1 CREAM TOPICAL ONCE
Refills: 0 | Status: COMPLETED | OUTPATIENT
Start: 2022-02-14 | End: 2022-02-14

## 2022-02-14 RX ORDER — ACETAMINOPHEN 500 MG
650 TABLET ORAL ONCE
Refills: 0 | Status: COMPLETED | OUTPATIENT
Start: 2022-02-14 | End: 2022-02-14

## 2022-02-14 RX ADMIN — LIDOCAINE 1 PATCH: 4 CREAM TOPICAL at 21:27

## 2022-02-14 RX ADMIN — CYCLOBENZAPRINE HYDROCHLORIDE 5 MILLIGRAM(S): 10 TABLET, FILM COATED ORAL at 21:27

## 2022-02-14 RX ADMIN — Medication 650 MILLIGRAM(S): at 21:27

## 2022-02-14 NOTE — ED ADULT NURSE NOTE - HAVE YOU HAD A FIRST COVID-19 BOOSTER?
ASTHMA ACTION PLAN for Chuck Torre     : 2007     Date: 19  Doctor:  Bong Sahu DO  Phone for doctor or clinic: Nikki Coker , [address] [facility], [address] [facility] [address], [address] [facility]       A albuterol sulfate (2.5 MG/3ML) 0.083% Inhalation Nebu Soln INHALE 1 VIAL VIA NEBULIZATION EVERY 4 TO 6 HOURS AS NEEDED    PROAIR  (90 BASE) MCG/ACT Inhalation Aero Soln INHALE 2 PUFFS BY MOUTH EVERY 4 HOURS AS NEEDED FOR WHEEZING                  2 Nose opens wide  Can't walk  Ribs show  Can't talk well Medicine How much to take When to take it    If your symptoms do not improve in ONE hour -  go to the emergency room or call 911 immediately!  If symptoms improve, call office for appointment immediate Yes

## 2022-02-14 NOTE — ED PROVIDER NOTE - PROGRESS NOTE DETAILS
Pt stable, feeling improved, requesting discharge. Discussed results of workup, importance of follow-up with PMD, pain ctrl w/nsaids, tylenol, ibuprofen, flexeril, lido patches, and return precautions with patient who verbalized understanding and agreement. Pt had opportunity to ask questions and address concerns. Patient is medically clear for DC at this time. -Suhail Schwartz, PGY-1

## 2022-02-14 NOTE — ED ADULT NURSE NOTE - NSICDXPASTSURGICALHX_GEN_ALL_CORE_FT
Consent: The risks of atrophy were reviewed with the patient. Detail Level: Detailed Concentration Of Solution Injected (Mg/Ml): 10.0 Total Volume Injected (Ccs- Only Use Numbers And Decimals): 0.4 Include Z78.9 (Other Specified Conditions Influencing Health Status) As An Associated Diagnosis?: No Kenalog Preparation: Kenalog X Size Of Lesion In Cm (Optional): 0 Medical Necessity Clause: This procedure was medically necessary because the lesions that were treated were: PAST SURGICAL HISTORY:  Atypical lipoma of soft tissue Excison of lipoma x 2 (2011 & 2012)    Fatty tumor Excision of left upper arm mass ; 12/2015

## 2022-02-14 NOTE — ED PROVIDER NOTE - PATIENT PORTAL LINK FT
You can access the FollowMyHealth Patient Portal offered by [facility] by registering at the following website: [email or web address]. By joining "[employer]’s FollowMyHealth portal, you will also be able to view your health information using other applications (apps) compatible with our system.

## 2022-02-14 NOTE — ED PROVIDER NOTE - CLINICAL SUMMARY MEDICAL DECISION MAKING FREE TEXT BOX
Pt is a 51yoM w/Hx of HTN, HLD p/w L-sided lower back pain without point tenderness, no midline tenderness, negative straight leg raise test. DDx muscle strain, will order lido patch, tylenol, flexeril for pain ctrl and likely DCTH. - Suhail Schwartz, PGY-1 Pt is a 51yoM w/Hx of HTN, HLD p/w L-sided lower back pain without point tenderness, no midline tenderness, negative straight leg raise test. DDx muscle sprain vs. sacroiliitis, will order lido patch, tylenol, flexeril for pain ctrl and likely DCTH. - Suhail Schwartz, PGY-1

## 2022-02-14 NOTE — ED PROVIDER NOTE - PHYSICAL EXAMINATION
Gen: AAOx3, non-toxic, WDWN male standing next to bed in NAD  Head: NCAT  HEENT: EOMI, oral mucosa moist, normal conjunctiva  Lung: CTAB, no respiratory distress, no wheezes/rhonchi/rales B/L, speaking in full sentences  CV: RRR, no murmurs, rubs or gallops  Abd: soft, NTND, no guarding, no CVA tenderness  Back; no bony step offs or deformities, no midline tenderness, no lateral point tenderness  MSK: no visible deformities; negative straight leg raise test   Neuro: No focal sensory or motor deficits  Skin: Warm, well perfused, no rash  Psych: normal affect.   ~Suhail Schwartz M.D. Resident

## 2022-02-14 NOTE — ED PROVIDER NOTE - NS ED ROS FT
GENERAL: No fever or chills, EYES: no change in vision, HEENT: no trouble swallowing or speaking, CARDIAC: no chest pain, PULMONARY: no cough or SOB, GI: no abdominal pain, no nausea, no vomiting, no diarrhea or constipation, : No changes in urination, SKIN: no rashes, NEURO: no headache, MSK: No joint pain     All other ROS negative unless otherwise specified in HPI.     ~Suhail Schwartz M.D. Resident

## 2022-02-14 NOTE — ED PROVIDER NOTE - ATTENDING CONTRIBUTION TO CARE
Attending MD [place]: I personally have seen and examined this patient.  Resident note reviewed and agree on plan of care and except where noted.  See below for details.     Seen in [facility]    51M with PMH/PSH including HTN, HLD, fatty liver presents to the ED with back pain s/p lifting water bottle case yesterday.  Reports felt immediate sharp pain in lower back and since then has been dull lower back pain.  Reports worse with change in position, movement, ambulating.  Reports improved with rest.  Reports took 2 Advil prior to presentation with some relief.  Denies loss of urinary or bowel continence. Denies numbness, weakness or tingling in extremities. Denies headache, visual changes, hearing changes.  Denies chest pain, shortness of breath, abdominal pain, nausea, vomiting, diarrhea, urinary complaints.  A ten (10) point review of systems was negative other than as stated in the HPI or elsewhere in the chart.     Exam:   General: NAD  HENT: head NCAT, airway patent  Eyes: anicteric  Lungs: lungs CTAB with good inspiratory effort, no wheezing, no rhonchi, no rales  Cardiac: +S1S2, no m/r/g  GI: abdomen soft with +BS, NT, ND  : no CVAT  MSK: FROM at neck, no tenderness to midline palpation, no stepoffs along length of spine  Neuro: moving all extremities spontaneously, sensory grossly intact, no gross neuro deficits, no saddle anesthesia  Psych: normal mood and affect   Skin: no ecchymosis or rash    A/P: 51M with back pain, suspect MSK, no red flags, history and clinical picture not consistent with cord compression, will give pain control, reassess

## 2022-02-14 NOTE — ED ADULT NURSE NOTE - OBJECTIVE STATEMENT
50 y/o male presents to ED from home c/o lower L sided back pain since yesterday after carrying a case of water at the store. Pt states pain began when he bent down to grab the case. Reports pain by L lower side with radiation to buttock. No radiation down leg. Pt states pain is intermittent and positional - at times worsens when changing positions from sitting to standing. Pt is A&O x 4. Breathing even and unlabored. Skin warm, dry, intact. Gross motor and neuro intact. Ambulates without difficulty. Safety and comfort provided.

## 2022-02-14 NOTE — ED PROVIDER NOTE - NSFOLLOWUPINSTRUCTIONS_ED_ALL_ED_FT
You were seen and evaluated in the Emergency Department for your atraumatic back pain. Clinical examination and history demonstrated no acute evidence of any life-threatening risks to your health as a result of your back pain. You received medication in the Emergency Department for your symptoms.     Please follow up with your doctor within 1 week. Bring copies of your results with you (provided in your discharge paperwork).     Continue to take Motrin/Advil (ibuprofen) with food every 6-8 hours.   You may take 600 mg ibuprofen every 8 hours, with food, as needed for pain.  You may take 500-1000 mg acetaminophen (tylenol) every 6 hours, as needed for pain.  You can take tylenol and ibuprofen at the same time.     PLEASE TAKE THE FOLLOWING MEDICATIONS AS PRESCRIBED:   Flexeril (cyclobenzaprine) 5mg, 1 tab, every 8 hours, AS NEEDED for pain.   Please obtain over-the-counter Lidoderm patches from the Pharmacy for topical treatment of your pain, apply to the affected area.    PLEASE RETURN TO ED FOR NEW OR WORSENING SYMPTOMS:  - hearing or feeling a popping sensation in your lower back  - increased pain or swelling in lower back  - difficulty moving legs, walking, or unexplained weakness  - leg numbness  - any other symptoms not easily explained    While emergent evaluation and imaging does not demonstrate any immediate life-threats, we recommend you follow up with a our Sports Medicine Clinic for further evaluation of your back pain and possible MRI and physical therapy, by calling the below number:    [facility]  Sports Medicine  [address] [place] [address]  Phone: [phone]    Should you develop nausea, vomiting, inability to walk properly, numbness or tingling in the extremities, urinary incontinence/retention, numbness in your groin - please immediately return to the Emergency Department for evaluation of your symptoms.     See the low back pain discharge information sheet for anticipatory guidelines regarding pain and other symptoms that you may experience.

## 2022-02-14 NOTE — ED PROVIDER NOTE - OBJECTIVE STATEMENT
Pt is a 51yoM w/Hx of HTN, HLD p/w back pain. Pt was lifting case of water bottle yesterday, felt sharp pain in lower back and since then has had dull 6/10 low back pain which is exacerbated by sitting, standing, walking/movement, relieved by rest. Pain persisted through today so pt came to ED for evaluation. He took 2 advil 2hrs before presentation with some relief. He denies HA, visual changes, hearing changes, cough/sore throat/congestion, SOB, pain with breathing, CP, palpitations, abd pain, n/v/d/c, dysuria/freq/urg, hematuria/hematochezia/melena, numbness/tingling, focal weakness, generalized weakness, swelling, dizziness/lightheadedness, fevers/chills, no sick contacts, no recent travel.

## 2022-02-18 ENCOUNTER — NON-APPOINTMENT (OUTPATIENT)
Age: 52
End: 2022-02-18

## 2022-04-07 ENCOUNTER — APPOINTMENT (OUTPATIENT)
Dept: OTOLARYNGOLOGY | Facility: CLINIC | Age: 52
End: 2022-04-07
Payer: COMMERCIAL

## 2022-04-07 VITALS
HEIGHT: 72 IN | BODY MASS INDEX: 25.06 KG/M2 | DIASTOLIC BLOOD PRESSURE: 87 MMHG | HEART RATE: 83 BPM | WEIGHT: 185 LBS | SYSTOLIC BLOOD PRESSURE: 129 MMHG

## 2022-04-07 PROCEDURE — 99214 OFFICE O/P EST MOD 30 MIN: CPT

## 2022-04-07 NOTE — HISTORY OF PRESENT ILLNESS
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 Today pt has no c.o of the oral area. pt had c/o right side forehead lesion for few months, hard and pointy and had CT done, here to review results.  Today pt denies pain, bleeding, trauma or headache. C/o intermittent clogged left ear for the past few months, feels like sometimes hearing is less than in right ear.  Denies otalgia, otorrhea.  \par Complete review of systems which was performed during a previous encounter was reviewed with the patient and there are no changes except as stated in the HPI section.\par \par CT neck 2/12/22: \par \par IMPRESSION: Very subtle right frontal outer table bony contour irregularity/protuberance which is contiguous with the left frontal sinus wall crossing over to the midline on the right side. This is of doubtful clinical significance. It appears unchanged when compared to the prior CT exam of the neck from 12/16/2020.\par \par Interval excision of a lipoma along the buccal surface of the right mandible near the mental foramen.\par \par Unchanged appearing mild enlarged bilateral level II cervical lymph nodes.\par \par

## 2022-04-07 NOTE — CONSULT LETTER
[Dear  ___] : Dear  [unfilled], [Courtesy Letter:] : I had the pleasure of seeing your patient, [unfilled], in my office today. [Please see my note below.] : Please see my note below. [Consult Closing:] : Thank you very much for allowing me to participate in the care of this patient.  If you have any questions, please do not hesitate to contact me. [Sincerely,] : Sincerely, [FreeTextEntry2] : Dr Joseph Bartholomew, Dr Han Olivares  [FreeTextEntry3] : \par Sharan Aguilera MD, FACS\par \par Otolaryngology-Head and Neck Surgery\par [employer] [facility]\par

## 2022-12-22 ENCOUNTER — APPOINTMENT (OUTPATIENT)
Dept: OTOLARYNGOLOGY | Facility: CLINIC | Age: 52
End: 2022-12-22

## 2022-12-22 VITALS — DIASTOLIC BLOOD PRESSURE: 77 MMHG | HEART RATE: 82 BPM | SYSTOLIC BLOOD PRESSURE: 127 MMHG

## 2022-12-22 DIAGNOSIS — L98.9 DISORDER OF THE SKIN AND SUBCUTANEOUS TISSUE, UNSPECIFIED: ICD-10-CM

## 2022-12-22 PROCEDURE — 99214 OFFICE O/P EST MOD 30 MIN: CPT

## 2022-12-22 RX ORDER — ENALAPRIL MALEATE 5 MG/1
5 TABLET ORAL
Qty: 90 | Refills: 0 | Status: COMPLETED | COMMUNITY
Start: 2021-11-06 | End: 2022-12-22

## 2022-12-22 RX ORDER — LOSARTAN POTASSIUM 25 MG/1
25 TABLET, FILM COATED ORAL
Refills: 0 | Status: ACTIVE | COMMUNITY

## 2022-12-22 NOTE — HISTORY OF PRESENT ILLNESS
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 \par h/o  right side forehead lesion for few months, hard and pointy and had CT done which did not show any lesions in the area. New CT scan was stable.\par Today pt here for new eval of tongue. Pt noticed dark spot on the right tip of tongue about 3 weeks ago. Reports having a cold before noticing lesion. Has not had new medications or new foods recently.   Denies tongue pain, bleeding, swelling, or tongue trauma. No dysphagia, dyspnea, dysphonia. No recent fever, chills, weight loss.\par Complete review of systems which was performed during a previous encounter was reviewed with the patient and there are no changes except as stated in the HPI section.\par

## 2023-08-24 ENCOUNTER — APPOINTMENT (OUTPATIENT)
Dept: OTOLARYNGOLOGY | Facility: CLINIC | Age: 53
End: 2023-08-24

## 2024-02-21 NOTE — ASU PATIENT PROFILE, ADULT - PROVIDER NOTIFICATION
Nutrition consulted. Most recent weight and BMI monitored-     Measurements:  Wt Readings from Last 1 Encounters:   02/20/24 67.6 kg (149 lb 0.5 oz)   Body mass index is 24.8 kg/m².    Patient has been screened and assessed by RD.    Malnutrition Type:  Context: chronic illness  Level: moderate    Malnutrition Characteristic Summary:  Energy Intake (Malnutrition): less than 75% for greater than or equal to 1 month  Muscle Mass (Malnutrition): mild depletion  Fluid Accumulation (Malnutrition): severe    Interventions/Recommendations (treatment strategy):  1.Continue gluten free diet with texture per SLP 2. [facility] 3. RD to monitor and follow      Yes

## 2024-11-05 PROBLEM — N63.20 BREAST MASS, LEFT: Status: ACTIVE | Noted: 2024-11-05

## 2024-11-07 ENCOUNTER — APPOINTMENT (OUTPATIENT)
Dept: SURGICAL ONCOLOGY | Facility: CLINIC | Age: 54
End: 2024-11-07
Payer: COMMERCIAL

## 2024-11-07 ENCOUNTER — NON-APPOINTMENT (OUTPATIENT)
Age: 54
End: 2024-11-07

## 2024-11-07 VITALS
DIASTOLIC BLOOD PRESSURE: 91 MMHG | SYSTOLIC BLOOD PRESSURE: 136 MMHG | WEIGHT: 178 LBS | OXYGEN SATURATION: 98 % | HEART RATE: 65 BPM | RESPIRATION RATE: 17 BRPM | HEIGHT: 72 IN | BODY MASS INDEX: 24.11 KG/M2

## 2024-11-07 DIAGNOSIS — N63.20 UNSPECIFIED LUMP IN THE LEFT BREAST, UNSPECIFIED QUADRANT: ICD-10-CM

## 2024-11-07 PROCEDURE — 99205 OFFICE O/P NEW HI 60 MIN: CPT

## 2025-05-23 ENCOUNTER — APPOINTMENT (OUTPATIENT)
Dept: OTOLARYNGOLOGY | Facility: CLINIC | Age: 55
End: 2025-05-23
Payer: COMMERCIAL

## 2025-05-23 ENCOUNTER — NON-APPOINTMENT (OUTPATIENT)
Age: 55
End: 2025-05-23

## 2025-05-23 VITALS
SYSTOLIC BLOOD PRESSURE: 127 MMHG | HEART RATE: 65 BPM | HEIGHT: 72 IN | BODY MASS INDEX: 24.11 KG/M2 | WEIGHT: 178 LBS | DIASTOLIC BLOOD PRESSURE: 83 MMHG

## 2025-05-23 DIAGNOSIS — K21.9 GASTRO-ESOPHAGEAL REFLUX DISEASE W/OUT ESOPHAGITIS: ICD-10-CM

## 2025-05-23 DIAGNOSIS — R49.0 DYSPHONIA: ICD-10-CM

## 2025-05-23 PROCEDURE — 99213 OFFICE O/P EST LOW 20 MIN: CPT | Mod: 25

## 2025-05-23 PROCEDURE — 31575 DIAGNOSTIC LARYNGOSCOPY: CPT

## 2025-05-23 RX ORDER — ATORVASTATIN CALCIUM 80 MG/1
TABLET, FILM COATED ORAL
Refills: 0 | Status: ACTIVE | COMMUNITY